# Patient Record
Sex: FEMALE | Race: WHITE | NOT HISPANIC OR LATINO | Employment: UNEMPLOYED | URBAN - METROPOLITAN AREA
[De-identification: names, ages, dates, MRNs, and addresses within clinical notes are randomized per-mention and may not be internally consistent; named-entity substitution may affect disease eponyms.]

---

## 2023-02-03 ENCOUNTER — TELEPHONE (OUTPATIENT)
Dept: FAMILY MEDICINE CLINIC | Facility: CLINIC | Age: 43
End: 2023-02-03
Payer: MEDICARE

## 2023-02-03 ENCOUNTER — HOSPITAL ENCOUNTER (INPATIENT)
Facility: HOSPITAL | Age: 43
LOS: 3 days | Discharge: HOME | DRG: 885 | End: 2023-02-06
Attending: PSYCHIATRY & NEUROLOGY | Admitting: PSYCHIATRY & NEUROLOGY
Payer: MEDICARE

## 2023-02-03 ENCOUNTER — HOSPITAL ENCOUNTER (EMERGENCY)
Facility: HOSPITAL | Age: 43
Discharge: PSYCHIATRIC OR SUBSTANCE USE FACILITY | DRG: 885 | End: 2023-02-03
Attending: EMERGENCY MEDICINE | Admitting: EMERGENCY MEDICINE
Payer: MEDICARE

## 2023-02-03 VITALS
RESPIRATION RATE: 16 BRPM | DIASTOLIC BLOOD PRESSURE: 80 MMHG | HEART RATE: 100 BPM | HEIGHT: 64 IN | OXYGEN SATURATION: 100 % | TEMPERATURE: 98.6 F | BODY MASS INDEX: 26.91 KG/M2 | SYSTOLIC BLOOD PRESSURE: 150 MMHG | WEIGHT: 157.63 LBS

## 2023-02-03 DIAGNOSIS — R45.851 DEPRESSION WITH SUICIDAL IDEATION: Primary | ICD-10-CM

## 2023-02-03 DIAGNOSIS — F32.A DEPRESSION WITH SUICIDAL IDEATION: Primary | ICD-10-CM

## 2023-02-03 PROBLEM — F33.1 MAJOR DEPRESSIVE DISORDER, RECURRENT EPISODE, MODERATE: Status: ACTIVE | Noted: 2023-02-03

## 2023-02-03 LAB
ALBUMIN SERPL-MCNC: 4.4 G/DL (ref 3.5–5.2)
ALBUMIN/GLOB SERPL: 1.5 G/DL
ALP SERPL-CCNC: 77 U/L (ref 39–117)
ALT SERPL W P-5'-P-CCNC: 9 U/L (ref 1–33)
AMPHET+METHAMPHET UR QL: NEGATIVE
ANION GAP SERPL CALCULATED.3IONS-SCNC: 9.1 MMOL/L (ref 5–15)
APAP SERPL-MCNC: <5 MCG/ML (ref 0–30)
AST SERPL-CCNC: 11 U/L (ref 1–32)
B-HCG UR QL: NEGATIVE
BARBITURATES UR QL SCN: NEGATIVE
BASOPHILS # BLD AUTO: 0.05 10*3/MM3 (ref 0–0.2)
BASOPHILS NFR BLD AUTO: 0.5 % (ref 0–1.5)
BENZODIAZ UR QL SCN: NEGATIVE
BILIRUB SERPL-MCNC: 0.3 MG/DL (ref 0–1.2)
BUN SERPL-MCNC: 10 MG/DL (ref 6–20)
BUN/CREAT SERPL: 12.8 (ref 7–25)
CALCIUM SPEC-SCNC: 9 MG/DL (ref 8.6–10.5)
CANNABINOIDS SERPL QL: POSITIVE
CHLORIDE SERPL-SCNC: 103 MMOL/L (ref 98–107)
CO2 SERPL-SCNC: 25.9 MMOL/L (ref 22–29)
COCAINE UR QL: NEGATIVE
CREAT SERPL-MCNC: 0.78 MG/DL (ref 0.57–1)
DEPRECATED RDW RBC AUTO: 52.1 FL (ref 37–54)
EGFRCR SERPLBLD CKD-EPI 2021: 97.4 ML/MIN/1.73
EOSINOPHIL # BLD AUTO: 0.25 10*3/MM3 (ref 0–0.4)
EOSINOPHIL NFR BLD AUTO: 2.6 % (ref 0.3–6.2)
ERYTHROCYTE [DISTWIDTH] IN BLOOD BY AUTOMATED COUNT: 17.7 % (ref 12.3–15.4)
ETHANOL BLD-MCNC: <10 MG/DL (ref 0–10)
ETHANOL UR QL: <0.01 %
GLOBULIN UR ELPH-MCNC: 2.9 GM/DL
GLUCOSE SERPL-MCNC: 97 MG/DL (ref 65–99)
HCT VFR BLD AUTO: 38.5 % (ref 34–46.6)
HGB BLD-MCNC: 12.4 G/DL (ref 12–15.9)
HOLD SPECIMEN: NORMAL
HOLD SPECIMEN: NORMAL
IMM GRANULOCYTES # BLD AUTO: 0.03 10*3/MM3 (ref 0–0.05)
IMM GRANULOCYTES NFR BLD AUTO: 0.3 % (ref 0–0.5)
LYMPHOCYTES # BLD AUTO: 1.41 10*3/MM3 (ref 0.7–3.1)
LYMPHOCYTES NFR BLD AUTO: 14.7 % (ref 19.6–45.3)
MCH RBC QN AUTO: 25.9 PG (ref 26.6–33)
MCHC RBC AUTO-ENTMCNC: 32.2 G/DL (ref 31.5–35.7)
MCV RBC AUTO: 80.4 FL (ref 79–97)
METHADONE UR QL SCN: NEGATIVE
MONOCYTES # BLD AUTO: 0.54 10*3/MM3 (ref 0.1–0.9)
MONOCYTES NFR BLD AUTO: 5.6 % (ref 5–12)
NEUTROPHILS NFR BLD AUTO: 7.28 10*3/MM3 (ref 1.7–7)
NEUTROPHILS NFR BLD AUTO: 76.3 % (ref 42.7–76)
NRBC BLD AUTO-RTO: 0 /100 WBC (ref 0–0.2)
OPIATES UR QL: NEGATIVE
OXYCODONE UR QL SCN: NEGATIVE
PLATELET # BLD AUTO: 369 10*3/MM3 (ref 140–450)
PMV BLD AUTO: 8.6 FL (ref 6–12)
POTASSIUM SERPL-SCNC: 4.1 MMOL/L (ref 3.5–5.2)
PROT SERPL-MCNC: 7.3 G/DL (ref 6–8.5)
RBC # BLD AUTO: 4.79 10*6/MM3 (ref 3.77–5.28)
SALICYLATES SERPL-MCNC: <0.3 MG/DL
SODIUM SERPL-SCNC: 138 MMOL/L (ref 136–145)
TSH SERPL DL<=0.05 MIU/L-ACNC: 2.04 UIU/ML (ref 0.27–4.2)
WBC NRBC COR # BLD: 9.56 10*3/MM3 (ref 3.4–10.8)
WHOLE BLOOD HOLD COAG: NORMAL
WHOLE BLOOD HOLD SPECIMEN: NORMAL

## 2023-02-03 PROCEDURE — 80307 DRUG TEST PRSMV CHEM ANLYZR: CPT | Performed by: EMERGENCY MEDICINE

## 2023-02-03 PROCEDURE — 99284 EMERGENCY DEPT VISIT MOD MDM: CPT

## 2023-02-03 PROCEDURE — 36415 COLL VENOUS BLD VENIPUNCTURE: CPT

## 2023-02-03 PROCEDURE — 81025 URINE PREGNANCY TEST: CPT | Performed by: PSYCHIATRY & NEUROLOGY

## 2023-02-03 PROCEDURE — 80053 COMPREHEN METABOLIC PANEL: CPT | Performed by: EMERGENCY MEDICINE

## 2023-02-03 PROCEDURE — 80179 DRUG ASSAY SALICYLATE: CPT | Performed by: EMERGENCY MEDICINE

## 2023-02-03 PROCEDURE — 80143 DRUG ASSAY ACETAMINOPHEN: CPT | Performed by: EMERGENCY MEDICINE

## 2023-02-03 PROCEDURE — 85025 COMPLETE CBC W/AUTO DIFF WBC: CPT

## 2023-02-03 PROCEDURE — 84443 ASSAY THYROID STIM HORMONE: CPT | Performed by: NURSE PRACTITIONER

## 2023-02-03 PROCEDURE — 82077 ASSAY SPEC XCP UR&BREATH IA: CPT | Performed by: EMERGENCY MEDICINE

## 2023-02-03 RX ORDER — HYDROXYZINE PAMOATE 50 MG/1
50 CAPSULE ORAL EVERY 6 HOURS PRN
Status: DISCONTINUED | OUTPATIENT
Start: 2023-02-03 | End: 2023-02-06 | Stop reason: HOSPADM

## 2023-02-03 RX ORDER — LORAZEPAM 2 MG/ML
2 INJECTION INTRAMUSCULAR EVERY 4 HOURS PRN
Status: DISCONTINUED | OUTPATIENT
Start: 2023-02-03 | End: 2023-02-06 | Stop reason: HOSPADM

## 2023-02-03 RX ORDER — HALOPERIDOL 5 MG/ML
5 INJECTION INTRAMUSCULAR EVERY 4 HOURS PRN
Status: DISCONTINUED | OUTPATIENT
Start: 2023-02-03 | End: 2023-02-06 | Stop reason: HOSPADM

## 2023-02-03 RX ORDER — TRAZODONE HYDROCHLORIDE 50 MG/1
100 TABLET ORAL NIGHTLY PRN
Status: DISCONTINUED | OUTPATIENT
Start: 2023-02-03 | End: 2023-02-06 | Stop reason: HOSPADM

## 2023-02-03 RX ORDER — DIPHENHYDRAMINE HYDROCHLORIDE 50 MG/ML
50 INJECTION INTRAMUSCULAR; INTRAVENOUS EVERY 4 HOURS PRN
Status: DISCONTINUED | OUTPATIENT
Start: 2023-02-03 | End: 2023-02-06 | Stop reason: HOSPADM

## 2023-02-03 RX ORDER — LOPERAMIDE HYDROCHLORIDE 2 MG/1
2 CAPSULE ORAL
Status: DISCONTINUED | OUTPATIENT
Start: 2023-02-03 | End: 2023-02-06 | Stop reason: HOSPADM

## 2023-02-03 RX ORDER — DIPHENHYDRAMINE HCL 50 MG
50 CAPSULE ORAL EVERY 4 HOURS PRN
Status: DISCONTINUED | OUTPATIENT
Start: 2023-02-03 | End: 2023-02-06 | Stop reason: HOSPADM

## 2023-02-03 RX ORDER — ACETAMINOPHEN 325 MG/1
650 TABLET ORAL EVERY 4 HOURS PRN
Status: DISCONTINUED | OUTPATIENT
Start: 2023-02-03 | End: 2023-02-06 | Stop reason: HOSPADM

## 2023-02-03 RX ORDER — HALOPERIDOL 5 MG/1
5 TABLET ORAL EVERY 4 HOURS PRN
Status: DISCONTINUED | OUTPATIENT
Start: 2023-02-03 | End: 2023-02-06 | Stop reason: HOSPADM

## 2023-02-03 RX ORDER — LORAZEPAM 2 MG/1
2 TABLET ORAL EVERY 4 HOURS PRN
Status: DISCONTINUED | OUTPATIENT
Start: 2023-02-03 | End: 2023-02-06 | Stop reason: HOSPADM

## 2023-02-03 RX ORDER — SODIUM CHLORIDE 0.9 % (FLUSH) 0.9 %
10 SYRINGE (ML) INJECTION AS NEEDED
Status: DISCONTINUED | OUTPATIENT
Start: 2023-02-03 | End: 2023-02-03 | Stop reason: HOSPADM

## 2023-02-03 RX ORDER — ALUMINA, MAGNESIA, AND SIMETHICONE 2400; 2400; 240 MG/30ML; MG/30ML; MG/30ML
15 SUSPENSION ORAL EVERY 6 HOURS PRN
Status: DISCONTINUED | OUTPATIENT
Start: 2023-02-03 | End: 2023-02-06 | Stop reason: HOSPADM

## 2023-02-03 RX ORDER — NICOTINE 21 MG/24HR
1 PATCH, TRANSDERMAL 24 HOURS TRANSDERMAL
Status: DISCONTINUED | OUTPATIENT
Start: 2023-02-03 | End: 2023-02-06 | Stop reason: HOSPADM

## 2023-02-03 RX ADMIN — TRAZODONE HYDROCHLORIDE 100 MG: 50 TABLET ORAL at 22:01

## 2023-02-03 NOTE — TELEPHONE ENCOUNTER
Per patients mother daughter is experiencing anxiety and depression. Per hub and mother daughter has self harmed herself. Advised mother that patient needs to be seen in the emergency room. Mother voiced understanding.

## 2023-02-03 NOTE — SIGNIFICANT NOTE
02/03/23 1247   Plan   Plan Comments LÓPEZ Rollins contacted [facility] for psych consult at 12:47pm.   Final Discharge Disposition Code 30 - still a patient

## 2023-02-03 NOTE — ED PROVIDER NOTES
Time: 10:49 AM EST  Date of encounter:  2/3/2023  Independent Historian/Clinical History and Information was obtained by:   Patient and Family  Chief Complaint: Anxiety, Depression, SI    History is limited by: Cognitive Impairment    History of Present Illness:  Patient is a 43 y.o. year old female who presents to the emergency department for evaluation of anxiety and depression.  The patient presents with her mother.  Patient does have a history of cognitive disability.  She is very tearful.  She tells me that she has been dealing with depression and suicidal thoughts for the last several days.  She states last night she tried to cut her wrists with intent to harm herself.  She denies any past history of suicide attempt.  She reports that depression has surfaced over the last couple of months and has progressively worsened.  She states that she is under a lot of stress at home.  She denies a plan currently but is agreeable to inpatient treatment for her thoughts.    HPI    Patient Care Team  Primary Care Provider: Provider, No Known    Past Medical History:     No Known Allergies  Past Medical History:   Diagnosis Date   • Anxiety      History reviewed. No pertinent surgical history.  History reviewed. No pertinent family history.    Home Medications:  Prior to Admission medications    Not on File        Social History:   Social History     Tobacco Use   • Smoking status: Never   • Smokeless tobacco: Current     Types: Chew   Vaping Use   • Vaping Use: Never used   Substance Use Topics   • Alcohol use: Yes     Comment: beer on occassion   • Drug use: Yes     Types: Marijuana     Comment: took marijuana gummy last night from a friend to help w anxiety         Review of Systems:  Review of Systems   Constitutional: Negative for activity change, appetite change, fatigue and fever.   HENT: Negative.    Eyes: Negative.    Respiratory: Negative for cough and shortness of breath.    Cardiovascular: Negative for chest  "pain.   Gastrointestinal: Negative for abdominal pain, diarrhea, nausea and vomiting.   Endocrine: Negative.    Genitourinary: Negative.    Musculoskeletal: Negative for myalgias.   Skin: Negative for color change, rash and wound.   Allergic/Immunologic: Negative.    Neurological: Negative for dizziness, weakness, light-headedness and headaches.   Hematological: Negative.    Psychiatric/Behavioral: Positive for self-injury, sleep disturbance and suicidal ideas. Negative for agitation and hallucinations. The patient is nervous/anxious.         Physical Exam:  /80 (BP Location: Right arm, Patient Position: Lying)   Pulse 100   Temp 98.6 °F (37 °C) (Oral)   Resp 16   Ht 162.6 cm (64\")   Wt 71.5 kg (157 lb 10.1 oz)   SpO2 100%   BMI 27.06 kg/m²     Physical Exam  Vitals and nursing note reviewed.   Constitutional:       General: She is in acute distress.      Appearance: She is not ill-appearing or toxic-appearing.   HENT:      Head: Normocephalic and atraumatic.      Nose: Nose normal.      Mouth/Throat:      Mouth: Mucous membranes are moist.      Pharynx: Oropharynx is clear.   Eyes:      Extraocular Movements: Extraocular movements intact.      Conjunctiva/sclera: Conjunctivae normal.      Pupils: Pupils are equal, round, and reactive to light.   Cardiovascular:      Rate and Rhythm: Normal rate and regular rhythm.      Pulses: Normal pulses.      Heart sounds: Normal heart sounds.   Pulmonary:      Effort: Pulmonary effort is normal.      Breath sounds: Normal breath sounds.   Abdominal:      Palpations: Abdomen is soft.   Musculoskeletal:      Cervical back: Normal range of motion and neck supple.   Skin:     General: Skin is warm and dry.      Capillary Refill: Capillary refill takes less than 2 seconds.      Findings: No rash.      Comments: Patient does have obvious superficial abrasions in a linear pattern to her left wrist.  No bleeding at this time.   Neurological:      General: No focal deficit " present.      Mental Status: She is alert and oriented to person, place, and time.   Psychiatric:         Attention and Perception: She does not perceive visual hallucinations.         Mood and Affect: Mood is depressed. Affect is tearful.         Behavior: Behavior is withdrawn.         Thought Content: Thought content includes suicidal ideation. Thought content does not include homicidal ideation. Thought content does not include homicidal or suicidal plan.                  Procedures:  Procedures      Medical Decision Making:      Comorbidities that affect care:    Developmental delay    External Notes reviewed:    None      The following orders were placed and all results were independently analyzed by me:  Orders Placed This Encounter   Procedures   • [facility] Draw   • Comprehensive Metabolic Panel   • Acetaminophen Level   • Ethanol   • Salicylate Level   • Urine Drug Screen - Urine, Clean Catch   • CBC Auto Differential   • TSH Rfx On Abnormal To Free T4   • NPO Diet NPO Type: Strict NPO   • Cardiac Monitoring   • Continuous Pulse Oximetry   • Vital Signs   • Undress & Gown   • Psych / Access to See   • IP General Consult (Use specialty-specific consult if known)   • POC Glucose Once   • Insert Peripheral IV   • Suicide Precautions   • CBC & Differential   • Green Top (Gel)   • Lavender Top   • Gold Top - SST   • Light Blue Top       Medications Given in the Emergency Department:  Medications   sodium chloride 0.9 % flush 10 mL (has no administration in time range)        ED Course:    ED Course as of 02/03/23 1613   Fri Feb 03, 2023   1320 [facility] staff here for evaluation at this time. [AR]   1606 Spoke with Dr. Thomson, on-call psychiatrist who agrees to admit patient for further evaluation management to [facility]. [AR]      ED Course User Index  [AR] Kim Madrigal, CHACHA       Labs:    Lab Results (last 24 hours)     Procedure Component Value Units Date/Time    CBC & Differential [[id number]]   (Abnormal) Collected: 02/03/23 1035    Specimen: Blood Updated: 02/03/23 1040    Narrative:      The following orders were created for panel order CBC & Differential.  Procedure                               Abnormality         Status                     ---------                               -----------         ------                     CBC Auto Differential[[id number]]        Abnormal            Final result                 Please view results for these tests on the individual orders.    Comprehensive Metabolic Panel [[id number]] Collected: 02/03/23 1035    Specimen: Blood Updated: 02/03/23 1104     Glucose 97 mg/dL      BUN 10 mg/dL      Creatinine 0.78 mg/dL      Sodium 138 mmol/L      Potassium 4.1 mmol/L      Chloride 103 mmol/L      CO2 25.9 mmol/L      Calcium 9.0 mg/dL      Total Protein 7.3 g/dL      Albumin 4.4 g/dL      ALT (SGPT) 9 U/L      AST (SGOT) 11 U/L      Alkaline Phosphatase 77 U/L      Total Bilirubin 0.3 mg/dL      Globulin 2.9 gm/dL      A/G Ratio 1.5 g/dL      BUN/Creatinine Ratio 12.8     Anion Gap 9.1 mmol/L      eGFR 97.4 mL/min/1.73     Narrative:      GFR Normal >60  Chronic Kidney Disease <60  Kidney Failure <15      Acetaminophen Level [[id number]]  (Normal) Collected: 02/03/23 1035    Specimen: Blood Updated: 02/03/23 1104     Acetaminophen <5.0 mcg/mL     Ethanol [[id number]] Collected: 02/03/23 1035    Specimen: Blood Updated: 02/03/23 1104     Ethanol <10 mg/dL      Ethanol % <0.010 %     Narrative:      Ethanol (Plasma)  <10 Essentially Negative    Toxic Concentrations           mg/dL    Flushing, slowing of reflexes    Impaired visual activity         Depression of CNS              >100  Possible Coma                  >300       Salicylate Level [[id number]]  (Normal) Collected: 02/03/23 1035    Specimen: Blood Updated: 02/03/23 1104     Salicylate <0.3 mg/dL     CBC Auto Differential [[id number]]  (Abnormal) Collected: 02/03/23 1035    Specimen: Blood Updated: 02/03/23  1040     WBC 9.56 10*3/mm3      RBC 4.79 10*6/mm3      Hemoglobin 12.4 g/dL      Hematocrit 38.5 %      MCV 80.4 fL      MCH 25.9 pg      MCHC 32.2 g/dL      RDW 17.7 %      RDW-SD 52.1 fl      MPV 8.6 fL      Platelets 369 10*3/mm3      Neutrophil % 76.3 %      Lymphocyte % 14.7 %      Monocyte % 5.6 %      Eosinophil % 2.6 %      Basophil % 0.5 %      Immature Grans % 0.3 %      Neutrophils, Absolute 7.28 10*3/mm3      Lymphocytes, Absolute 1.41 10*3/mm3      Monocytes, Absolute 0.54 10*3/mm3      Eosinophils, Absolute 0.25 10*3/mm3      Basophils, Absolute 0.05 10*3/mm3      Immature Grans, Absolute 0.03 10*3/mm3      nRBC 0.0 /100 WBC     TSH Rfx On Abnormal To Free T4 [[id number]]  (Normal) Collected: 02/03/23 1035    Specimen: Blood Updated: 02/03/23 1334     TSH 2.040 uIU/mL     Urine Drug Screen - Urine, Clean Catch [[id number]]  (Abnormal) Collected: 02/03/23 1115    Specimen: Urine, Clean Catch Updated: 02/03/23 1138     Amphet/Methamphet, Screen Negative     Barbiturates Screen, Urine Negative     Benzodiazepine Screen, Urine Negative     Cocaine Screen, Urine Negative     Opiate Screen Negative     THC, Screen, Urine Positive     Methadone Screen, Urine Negative     Oxycodone Screen, Urine Negative    Narrative:      Negative Thresholds Per Drugs Screened:    Amphetamines                 500 ng/ml  Barbiturates                 200 ng/ml  Benzodiazepines              100 ng/ml  Cocaine                      300 ng/ml  Methadone                    300 ng/ml  Opiates                      300 ng/ml  Oxycodone                    100 ng/ml  THC                           50 ng/ml    The Normal Value for all drugs tested is negative. This report includes final unconfirmed screening results to be used for medical treatment purposes only. Unconfirmed results must not be used for non-medical purposes such as employment or legal testing. Clinical consideration should be applied to any drug of abuse test, particularly  when unconfirmed results are used.                   Imaging:    No Radiology Exams Resulted Within Past 24 Hours      Differential Diagnosis and Discussion:    Psychiatric: Differential diagnosis includes but is not limited to depression, psychosis, bipolar disorder, anxiety, manic episode, schizophrenia, and substance abuse.    All labs were reviewed and analyzed by me.    MDM  Number of Diagnoses or Management Options  Depression with suicidal ideation  Diagnosis management comments: Patient presents today with anxiety, depression and suicidal thoughts.  She does have superficial self-inflicted wounds to her left wrist.  Patient declines having plan today but is very tearful and has a depressed mood.  Patient was evaluated by community care as well as their DD ID staff for evaluation.  It was recommended that patient receive placement and higher level of care given her thoughts as well as her history.  Overall labs were normal.  Urine drug screen was positive for THC.  Vitals are stable.  Patient is cooperative but very tearful and sad.  Spoke with Dr. Thomson, on-call psychiatrist who has agreed to admit the patient for further evaluation and management.       Amount and/or Complexity of Data Reviewed  Clinical lab tests: reviewed and ordered  Tests in the medicine section of CPT®: ordered and reviewed  Obtain history from someone other than the patient: yes (mother)  Review and summarize past medical records: yes    Risk of Complications, Morbidity, and/or Mortality  Presenting problems: high  Diagnostic procedures: moderate  Management options: high             Patient Care Considerations:    All pertinent care and consideration provided for patient management today      Consultants/Shared Management Plan:    Consultant: I have discussed the case with Dr. Thomson who states Patient will be [facility] to [facility] for further evaluation and management of care    Social Determinants of Health:    Patient has  presented with family members who are responsible, reliable and will ensure follow up care.      Disposition and Care Coordination:    Admit:   Through independent evaluation of the patient's history, physical, and imperical data, the patient meets criteria for observation/admission to the hospital.        Final diagnoses:   Depression with suicidal ideation        ED Disposition     ED Disposition   DC/Transfer to Behavioral Health Condition   Stable    Comment   --             This medical record created using voice recognition software.           Kim Madrigal, APRN  02/03/23 1616

## 2023-02-03 NOTE — NURSING NOTE
42 y/o female was admitted from the Ed to the [facility] unit at 1742 to the services of dr. Quezada. Per report from Ed pt. Came to the ED c/o increased depression and anxiety, reporting that last night she had superficially cut her wrist and a care giver had given her a marijuana gummy to help her relax.. Pt. On arrival pt. Was searched for sharps and contraband by female staff. Pt. Was calm and cooperative with a few tears during assessment. Pt. Denied any si/hi/avh at this time and contracted for safety, as well as denied any past si or psych admissions. Pt. Reports the superficial cut on her wrist was not really a suicide attempt, but just some self harm. Pt. Talked about just having some stress at home, but could not really specify anything in particular. Pt. Was oriented to the unit and is waiting for her dinner. Pt. bathroom door locked for safety. Will con't to monitor and provide a safe environment.

## 2023-02-03 NOTE — TELEPHONE ENCOUNTER
Pt has not established with this office. She has a new patient appointment with Brynn on 2/28/2023

## 2023-02-04 PROBLEM — F12.10 MARIJUANA ABUSE: Status: ACTIVE | Noted: 2023-02-04

## 2023-02-04 PROBLEM — R41.83 BORDERLINE INTELLECTUAL FUNCTIONING: Status: ACTIVE | Noted: 2023-02-04

## 2023-02-04 RX ORDER — VENLAFAXINE HYDROCHLORIDE 75 MG/1
75 CAPSULE, EXTENDED RELEASE ORAL
Status: DISCONTINUED | OUTPATIENT
Start: 2023-02-04 | End: 2023-02-06 | Stop reason: HOSPADM

## 2023-02-04 RX ADMIN — VENLAFAXINE HYDROCHLORIDE 75 MG: 75 CAPSULE, EXTENDED RELEASE ORAL at 11:30

## 2023-02-04 NOTE — PLAN OF CARE
"Goal Outcome Evaluation:  Plan of Care Reviewed With: patient    Pt is alert, oriented, not in distress. Pt denies current SI, HI or AVH. Pt denies current anxiety and endorses intermittent depression that \"comes and goes.\" Pt did attend group. Pt stated that she needed medication for sleep, so she was given trazodone. Will continue to monitor this patient and provide a safe environment. -- AS RN  "

## 2023-02-04 NOTE — PLAN OF CARE
Goal Outcome Evaluation:  Plan of Care Reviewed With: patient  Patient Agreement with Plan of Care: agrees   PATIENT ALERT AND ORIENTED AND CALM AND COOPERATIVE WITH STAFF. COMPLIANT WITH MEDICATIONS. PATIENT DENIES S/I, H/I OR HALLUCINATIONS. PATIENT REPORTS STRESSORS INVOLVING HER HOME LIFE CAUSED HER TO BECOME MORE DEPRESSED AND ANXIOUS. PATIENT HAS BEEN UP IN DAYROOM MOST OF SHIFT INTERACTING WITH HER PEERS. NO INAPPROPRIATE OR AGGRESSIVE BEHAVIOR NOTED. WILL CONTINUE TO MONITOR FOR CHANGES

## 2023-02-04 NOTE — H&P
" [facility]   PSYCHIATRIC  HISTORY AND PHYSICAL    Patient Name: Angela Song  : 1980  MRN: [id number]  Primary Care Physician:  Kiera, No Known  Date of admission: 2/3/2023    Subjective   Subjective     Chief Complaint: \"Tried to cut myself\"    HPI:     Angela Song is a 43 y.o. female with a history of anxiety,depression, hypothyroidism, hypertension, as well as borderline intellectual functioning.  Patient is tearful during the exam but is appropriate and controlled.  She reports that she cut her wrist and she has 4 scratches to the anterior left wrist which required no treatment.  Mom saw the scratches called her primary care doctor suggested she come to the emergency room.  Please suicidal ideation while in emergency room.    Patient reports she has a lot going on and that there is been a lot of changes very quickly at home.  She states that her grandfather is moved out of the house and is sad or down.  She also reports that her nieces moved out of the house, and when his mood way from town she is unable to see them as much as she used to and is down about this.  She states there is been lots happening very quickly but cannot really quantify any other changes in her life.  She reports her 2 nieces were like her own kids.  She reports that she misses family.  She is upset throughout the exam as well up with tears does appear quite sad and down.    She reports that she feels very anxious and states, \"stuff gets my head and I get into a panic.\"  She feels like people often talk about her behind her back.  She reports her anxiety and depression started 4 to 5 months ago.  Reports that she just sits at home does not go places and this gets her down.  She does not have a 's license and has been on others.  Reports depression makes her feel sad.  She has had a decrease in sleep and when she tries to go to sleep describes her mind is racing.  Her energy level has been good.  She feels " helpless at times.  She has had a good appetite.  She denies suicidal ideations today and states she does not know why she attempted to cut her wrist yesterday.  She is unable to identify any particular stressor that led to her suicide attempt.  However she does report that she feels useless and has some self-esteem issues.  She has been increasingly emotional and tearful.  Ports that she can also be more irritable lately.          Review of Systems:      CONSTITUTIONAL: Feels well denies any acute medical problems  HEENT: No visual problems, no hearing difficulty, no dysphasia,  LUNGS: no cough, no shortness of breath;  CARDIOVASCULAR: no palpitation, no chest pain,   GI: no abdominal pain, no nausea, no vomiting, no diarrhea, no constipation;  AMIE: no dysuria, no hematuria, no frequency or urgency,   MUSCULOSKELETAL: no joint pain, no swelling, no stiffness;  ENDOCRINE: no cold or heat intolerance;  HEMATOLOGY: no easy bruising or bleeding,   DERMATOLOGY: no skin rash, no pruritus;  NEUROLOGY: no syncope, no seizures, no numbness or tingling of hands, no   numbness or tingling of feet,   PSYCHIATRIC: As documented in HPI    Personal History     Past Medical History:   Diagnosis Date   • Anxiety    • Borderline intellectual functioning    • Hypertension    • Hypothyroid        History reviewed. No pertinent surgical history.    Past Psychiatric History: Denies seeing a psychiatrist in the past.  Has had medications prescribed by primary care provider for anxiety    Psychiatric Hospitalizations: This is her first hospitalization    Suicide Attempts: Denies any previous attempts    Prior Treatment and Medications Tried: Believes she may have been on Zoloft but is unsure      Family History: family history includes Alcohol abuse in her sister; Clotting disorder in her father; Diabetes in her maternal grandfather; Hypertension in her mother and sister; Suicidality in her maternal uncle. Otherwise pertinent FHx was  reviewed and not pertinent to current issue.    Family Substance Abuse History:None known to patient      Social History:     Lives with mom and sister.  Born and raised in [place].  [place] farm.    Mom is legal guardian    She was never in the     Described himself as spiritual.    Denies history of abuse    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   • Highest education level: High school graduate   Tobacco Use   • Smoking status: Never   • Smokeless tobacco: Current     Types: Chew   Vaping Use   • Vaping Use: Never used   Substance and Sexual Activity   • Alcohol use: Yes     Comment: beer on occassion   • Drug use: Yes     Types: Marijuana     Comment: took marijuana gummy last night from a friend to help w anxiety   • Sexual activity: Defer       Substance Abuse History: reports that she has never smoked. Her smokeless tobacco use includes chew. She reports current alcohol use. She reports current drug use. Drug: Marijuana.    Home Medications: None         Allergies:  No Known Allergies    Objective   Objective     Vitals:   Temp:  [97.5 °F (36.4 °C)-98.1 °F (36.7 °C)] 98.1 °F (36.7 °C)  Heart Rate:  [62-78] 74  Resp:  [16-18] 18  BP: (114-151)/(85-98) 114/85    Physical Exam:      CONSTITUTIONAL: Patient is well developed, well nourished, awake and alert.  HEENT: Head and neck are normocephalic and atraumatic. Pupils equal and  round.  Sclerae clear. No icterus.  LUNGS: Even unlabored respirations.  CARDIAC: Normal rate and rhythm.  ABDOMEN: Nondistended.  SKIN: Clean, dry, intact.  4 scratches self-inflicted on the left anterior wrist  EXTREMITIES: No clubbing, cyanosis, edema.  MUSCULOSKELETAL: Symmetric body habitus. Spine straight. Strength intact,  full range of motion.  NEUROLOGIC: Appropriate. No abnormal movements, good muscle tone.                              Cerebellar:  "station and gait steady.  Cranial Nerves:  CN II: Visual fields without deficit.  CN III: Pupils symmetric.  CN III, IV, VI:  Extraocular eye muscles intact, no nystagmus.  CN V: Jaw open and closing normal.  CN VII: Frown and smile symmetric.  CN VIII: Hearing intact.  CN IX, X: Palate rise normal; phonation without hoarseness.  CN XI: Shoulder shrug equal.  CN XII: Tongue midline, no fasciculations, no dysarthria.    Mental Status Exam:     Awake, alert, oriented female appears appropriate for stated age.  She participates fully in exam.  Has an obvious learning disability but able to participate in exam appropriately with full understanding of situation in interview.       Hygiene:   good  Cooperation:  Cooperative  Eye Contact:  Fair  Psychomotor Behavior:  Appropriate  Affect:  Restricted and Tearful  Mood: \"I do good and then I do not\"  Speech:  Normal  Language: Appropriate, relevant  Thought Process:  Goal directed and Somewhat simple, concrete  Thought Content:  Normal  Suicidal:  Denies today  Homicidal:  None  Hallucinations:  None  Delusion:  None  Memory:  Intact  Orientation:  Person, Place, Time and Situation  Reliability:  fair  Insight:  Fair  Judgement:  Impaired  Impulse Control:  Impaired        Result Review    Result Review:  I have personally reviewed the results from the time of this admission to 2/4/2023 10:51 EST and agree with these findings:  [x]  Laboratory  []  Microbiology  []  Radiology  []  EKG/Telemetry   []  Cardiology/Vascular   []  Pathology  []  Old records  []  Other:  Most notable findings include: No pertinent negative or positives    Assessment & Plan   Assessment / Plan     Brief Patient Summary:  Angela Song is a 43 y.o. female who with history of hypertension and hypothyroidism not been resolved under control without medication.  She is here on a voluntary basis after suicide attempt with increased depression and anxiety.    Active Hospital Problems:  Active Hospital " Problems    Diagnosis    • **Major depressive disorder, recurrent episode, moderate (HCC)    • Marijuana abuse    • Borderline intellectual functioning        Plan:   • Initiate venlafaxine 75 mg daily for depression and anxiety.  Discussed medication with the patient and she is agreeable.  • History of hypertension and hypothyroidism previously treated and has been off medications for some time and has been normotensive and TSH normal and not requiring any treatment or further investigation  • Admit for safety and stabilization and begin treatment for underlying mood disorder or psychosis with appropriate medications  • Attempt to gain collateral information of possible  • Work on safety plan  • Provide supportive therapy  • Patient to engage in all group and individual treatment modalities available including milieu therapy  • Work on appropriate disposition follow-up  • Estimated length of stay in hospital 4 to 5 days      DVT prophylaxis:  Mechanical DVT prophylaxis orders are present.    CODE STATUS:    Code Status (Patient has no pulse and is not breathing): CPR (Attempt to Resuscitate)  Medical Interventions (Patient has pulse or is breathing): Full Support      Admission Status:  I believe this patient meets inpatient status.      Part of this note may be an electronic transcription/translation of spoken language to printed text using the Dragon dictation system.        Electronically signed by Hermilo Quezada MD, 02/04/23, 10:30 AM EST.

## 2023-02-05 RX ADMIN — VENLAFAXINE HYDROCHLORIDE 75 MG: 75 CAPSULE, EXTENDED RELEASE ORAL at 08:45

## 2023-02-05 NOTE — PLAN OF CARE
Goal Outcome Evaluation:  Plan of Care Reviewed With: patient  Patient Agreement with Plan of Care: agrees         Pt has been out in dayroom and in her room today.  She is calm and cooperative and pleasant.  She denies SI/HI, AVH, and contracts for safety.  She rates depression 1 and anxiety 10, but declines medication for symptom control.  She is med compliant.  Pt family visited today. Her mother is asking about discharge planning and reports she has already been in contact with [facility] and has an appointment in one month.  She reports she had been caret-aking for patient's grandfather, and she believes patient has been feeling neglected.  She reports that a few weeks ago, she walked three miles away from home. Provided pt mother with information on Effexor and supporting someone with depression. Pt is able to make her needs known. Will continue to monitor.

## 2023-02-05 NOTE — PLAN OF CARE
Goal Outcome Evaluation:  Plan of Care Reviewed With: patient    Pt is alert, oriented, not in distress. Pt denies current SI, HI or AVH. Pt states that she had depression and anxiety earlier, but not today. Pt was observed in the dayroom interacting with her peers appropriately. Pt advised that the trazodone did help her sleep last night and would like that again tonight. Will continue to monitor this patient and provide a safe environment. -- AS RN

## 2023-02-05 NOTE — PROGRESS NOTES
" [facility]     Psychiatric Progress Note    Patient Name: Angela Song  : 1980  MRN: [id number]  Primary Care Physician:  Provider, No Known  Date of admission: 2/3/2023    Subjective   Subjective     Patient seen and chart reviewed, discussed with staff.    Chief Complaint: Depression, anxiety      HPI:     Staff reports the patient slept well last night.  She been denying suicidal or homicidal ideation as well as any hallucinations.  Reported her anxiety as a 10 and depression as a 1 to staff.  She was noted to slept well last night.  Talk with multiple family members on the phone.    Patient day is calm and cooperative.  She reports that she is doing better.  She denies having any acute panic but continues to report having severe anxiety.  She reports that she just gets very worried and over thinks things.  She does appear to be a little anxious today.  She reports that she slept well and states, \"I did not need a sleeping pill\" but she did get a dose of trazodone.  She reports her mood has been pretty good but she continues to feel worried and nervous.  He did not require any Vistaril discussed that she has medicine available for anxiety if she experiences acute anxiety.  She is hopeful family can visit [facility].      Objective   Objective     Vitals:   Temp:  [97.8 °F (36.6 °C)-98 °F (36.7 °C)] 97.8 °F (36.6 °C)  Heart Rate:  [60-64] 64  Resp:  [18] 18  BP: (128-132)/(83-84) 132/83          Mental Status Exam:      Appearance:   Well-developed, well-nourished, calm, cooperative  Reliability:   Fair to good  Eye Contact:   Fair, somewhat avoidant  Concentration/Focus:    Attentive to the interview  Behaviors:    No restlessness or agitation  Memory :    Sensorium intact, no deficits  Speech:    Spontaneous, normal rate and volume  Language:   Appropriate, relevant  Mood :    \"Pretty good\"  Affect:    Anxious, constricted  Thought process:    Linear with no paranoia or delusions   Thought Content:   "  Denies suicidal or homicidal ideation, denies hallucinations and no evidence of hallucinations  Insight:   Good  Judgement:    Intact, no behavioral disturbance      Result Review    Result Review:  I have personally reviewed the results from the time of this admission to 2/5/2023 10:19 EST and agree with these findings:  []  Laboratory  []  Microbiology  []  Radiology  []  EKG/Telemetry   []  Cardiology/Vascular   []  Pathology  []  Old records  []  Other:  Most notable findings include:     Lab Results (last 24 hours)     ** No results found for the last 24 hours. **              Medications:   nicotine, 1 patch, Transdermal, Q24H  venlafaxine XR, 75 mg, Oral, Daily With Breakfast          Assessment / Plan       Active Hospital Problems:  Active Hospital Problems    Diagnosis    • **Major depressive disorder, recurrent episode, moderate (HCC)    • Marijuana abuse    • Borderline intellectual functioning        Plan:     • Continue current treatment protocol and titrate medications as clinically indicated  • Hopefully family can visit today to provide feedback  • Work on mood stabilization and abatement of any suicidal ideation or psychosis.  Work on appropriate safety plan  • Continue supportive therapy  • Patient to engage in all group and individual treatment modalities available on the unit  • Obtain collateral information if possible  • Titrate medications as clinically indicated  • Work on appropriate disposition follow-up including referrals to substance abuse treatment if indicated      Disposition:  I expect patient to be discharged 1 to 2 days.    Part of this note may be an electronic transcription/translation of spoken language to printed text using the Dragon dictation system.         Electronically signed by Hermilo Quezada MD, 02/05/23, 10:19 AM EST.

## 2023-02-06 VITALS
BODY MASS INDEX: 26.91 KG/M2 | DIASTOLIC BLOOD PRESSURE: 97 MMHG | SYSTOLIC BLOOD PRESSURE: 137 MMHG | OXYGEN SATURATION: 100 % | WEIGHT: 157.63 LBS | HEIGHT: 64 IN | TEMPERATURE: 97.5 F | HEART RATE: 66 BPM | RESPIRATION RATE: 16 BRPM

## 2023-02-06 PROBLEM — F41.1 GENERALIZED ANXIETY DISORDER: Status: ACTIVE | Noted: 2023-02-06

## 2023-02-06 PROCEDURE — 90686 IIV4 VACC NO PRSV 0.5 ML IM: CPT | Performed by: PSYCHIATRY & NEUROLOGY

## 2023-02-06 PROCEDURE — G0008 ADMIN INFLUENZA VIRUS VAC: HCPCS | Performed by: PSYCHIATRY & NEUROLOGY

## 2023-02-06 PROCEDURE — 25010000002 INFLUENZA VAC SPLIT QUAD 0.5 ML SUSPENSION PREFILLED SYRINGE: Performed by: PSYCHIATRY & NEUROLOGY

## 2023-02-06 RX ORDER — VENLAFAXINE HYDROCHLORIDE 75 MG/1
75 CAPSULE, EXTENDED RELEASE ORAL
Qty: 30 CAPSULE | Refills: 2 | Status: SHIPPED | OUTPATIENT
Start: 2023-02-07

## 2023-02-06 RX ADMIN — VENLAFAXINE HYDROCHLORIDE 75 MG: 75 CAPSULE, EXTENDED RELEASE ORAL at 08:24

## 2023-02-06 RX ADMIN — INFLUENZA VIRUS VACCINE 0.5 ML: 15; 15; 15; 15 SUSPENSION INTRAMUSCULAR at 11:04

## 2023-02-06 NOTE — PLAN OF CARE
Goal Outcome Evaluation:  Plan of Care Reviewed With: patient  Patient Agreement with Plan of Care: agrees   PATIENT HAS REACHED ALL GOALS AND WILL BE DISCHARGED FROM UNIT.  PATIENT WILL CONTINUE TREATMENT ON OUTPATIENT BASIS .

## 2023-02-06 NOTE — PLAN OF CARE
"Goal Outcome Evaluation:  Plan of Care Reviewed With: patient  Patient Agreement with Plan of Care: agrees    Patient was awake and alert upon start of shift, clear speech, flat affect, describes moood as \"good\", able to make needs known, denies SI/HI, denies AVH, rates anxiety 2/10 and depression 2/10, pt stated she was feeling better, patient contracts for safety, pt was in dayroom watching tv but withdrawn, pt went to sleep without issues. Will continue to monitor and provide safe environment.           "

## 2023-02-06 NOTE — DISCHARGE SUMMARY
[facility]         DISCHARGE SUMMARY    Patient Name: Angela Song  : 1980  MRN: [id number]    Date of Admission: 2/3/2023  Date of Discharge: 2023  Primary Care Physician: Provider, No Known    Consults     No orders found from 2023 to 2023.          Presenting Problem:   Major depressive disorder, recurrent episode, moderate (HCC) [F33.1]    Active and Resolved Hospital Problems:  Active Hospital Problems    Diagnosis POA   • **Major depressive disorder, recurrent episode, moderate (HCC) [F33.1] Yes   • Generalized anxiety disorder [F41.1] Yes   • Marijuana abuse [F12.10] Yes   • Borderline intellectual functioning [R41.83] Not Applicable      Resolved Hospital Problems   No resolved problems to display.         Hospital Course       Hospital Course:  Angela Song is a 43 y.o. female minute on voluntary basis for depression and anxiety with suicidal ideation and self-inflicted scratches to wrist.    The patient reports that she had a lot on her mind felt very overwhelmed.  She reports being very anxious and reports that she has had panic attacks.  Patient, cooperative throughout her stay.  She was denying suicidal ideations on initial evaluation.  Patient has a history of a learning disability and borderline intellectual functioning but is able to fully participate in interview and care.    Discussed medications at length and she thinks she has had 1 previous trial of the medication.  Reported she may have been on sertraline in the past.  We discussed use of venlafaxine and she was agreeable.    Patient been calm and cooperative throughout her stay.  She is in no acute agitation or anxiety.  She does not require any medicines for acute agitation or anxiety.  She required trazodone on her first night for sleep and has not required it since that time.  She tolerated the venlafaxine well and had no side effects.  Family visited on day prior to discharge and the visit went  "very well.  Family reports the patient appears improved and they agree that she could be discharged home safely.    Day of discharge she is calm, cooperative, engaging, makes good eye contact.  She is future oriented goal-directed.  There is no psychomotor restlessness or agitation.  Speech is articulate, fluent, normal rate and volume.  Language is appropriate relevant.  Mood is described as \"pretty good\" she has congruent affect.  Thought processes are simple but goal directed and linear.  Thought content is negative for suicidal or homicidal ideation auditory visualizations.  Insight and judgment are intact.        DISCHARGE Follow Up Recommendations for labs and diagnostics: Routine health maintenance from [facility], [facility]      Day of Discharge     Vital Signs:  Temp:  [97.5 °F (36.4 °C)-97.6 °F (36.4 °C)] 97.5 °F (36.4 °C)  Heart Rate:  [62-66] 66  Resp:  [16-18] 16  BP: (133-137)/(87-97) 137/97      Pertinent  and/or Most Recent Results     LAB RESULTS:      Lab 02/03/23  1035   WBC 9.56   HEMOGLOBIN 12.4   HEMATOCRIT 38.5   PLATELETS 369   NEUTROS ABS 7.28*   IMMATURE GRANS (ABS) 0.03   LYMPHS ABS 1.41   MONOS ABS 0.54   EOS ABS 0.25   MCV 80.4         Lab 02/03/23  1035   SODIUM 138   POTASSIUM 4.1   CHLORIDE 103   CO2 25.9   ANION GAP 9.1   BUN 10   CREATININE 0.78   EGFR 97.4   GLUCOSE 97   CALCIUM 9.0   TSH 2.040         Lab 02/03/23  1035   TOTAL PROTEIN 7.3   ALBUMIN 4.4   GLOBULIN 2.9   ALT (SGPT) 9   AST (SGOT) 11   BILIRUBIN 0.3   ALK PHOS 77                                     Lab 02/03/23  1035   ETHANOL PCT <0.010   ETHANOL MGDL <10         Lab 02/03/23  1115   AMPH/METHAM SCREEN, URINE Negative   BENZODIAZEPINE SCREEN, URINE Negative   COCAINE SCREEN, URINE Negative   OPIATES Negative   THC URINE SCREEN Positive*   METHADONE SCREEN, URINE Negative     Brief Urine Lab Results  (Last result in the past 365 days)      Color   Clarity   Blood   Leuk Est   Nitrite   Protein   " CREAT   Urine HCG        02/03/23 1115               Negative                                 Imaging Results (Last 7 Days)     ** No results found for the last 168 hours. **           Labs Pending at Discharge:           Discharge Details        Discharge Medications      New Medications      Instructions Start Date   venlafaxine XR 75 MG 24 hr capsule  Commonly known as: EFFEXOR-XR   75 mg, Oral, Daily With Breakfast   Start Date: February 7, 2023            No Known Allergies      Discharge Disposition:  Home or Self Care    Diet:  Hospital:  Diet Order   Procedures   • Diet: Regular/House Diet; Texture: Regular Texture (IDDSI 7); Fluid Consistency: Thin (IDDSI 0)         Discharge Activity:   Activity Instructions     Activity as Tolerated            Discharge Condition: Stable    CODE STATUS:  Code Status and Medical Interventions:   Ordered at: 02/03/23 1700     Code Status (Patient has no pulse and is not breathing):    CPR (Attempt to Resuscitate)     Medical Interventions (Patient has pulse or is breathing):    Full Support         Future Appointments   Date Time Provider Department Center   2/28/2023  7:00 AM Brynn Arreola APRN [facility]       Additional Instructions for the Follow-ups that You Need to Schedule     Discharge Follow-up with PCP   As directed       Currently Documented PCP:    Provider, No Known    PCP Phone Number:    None     Follow Up Details: As needed         Discharge Follow-up with Specified Provider: [facility]   As directed      To: [facility]               Time spent on Discharge including face to face service: 30 minutes    Part of this note may be an electronic transcription/translation of spoken language to printed text using the Dragon dictation system.        Electronically signed by Hermilo Quezada MD, 02/06/23, 10:47 AM EST.

## 2023-02-06 NOTE — SIGNIFICANT NOTE
02/06/23 1107   Plan   Plan Patient will discharge home with scheduled outpatient at [facility].   Patient/Family in Agreement with Plan yes   Final Discharge Disposition Code 01 - home or self-care

## 2023-02-28 ENCOUNTER — OFFICE VISIT (OUTPATIENT)
Dept: FAMILY MEDICINE CLINIC | Facility: CLINIC | Age: 43
End: 2023-02-28
Payer: MEDICARE

## 2023-02-28 VITALS
SYSTOLIC BLOOD PRESSURE: 104 MMHG | RESPIRATION RATE: 20 BRPM | HEIGHT: 64 IN | DIASTOLIC BLOOD PRESSURE: 82 MMHG | BODY MASS INDEX: 27.23 KG/M2 | OXYGEN SATURATION: 100 % | HEART RATE: 70 BPM | WEIGHT: 159.5 LBS

## 2023-02-28 DIAGNOSIS — J30.2 SEASONAL ALLERGIC RHINITIS, UNSPECIFIED TRIGGER: ICD-10-CM

## 2023-02-28 DIAGNOSIS — Z12.31 ENCOUNTER FOR SCREENING MAMMOGRAM FOR MALIGNANT NEOPLASM OF BREAST: ICD-10-CM

## 2023-02-28 DIAGNOSIS — F41.1 GENERALIZED ANXIETY DISORDER: ICD-10-CM

## 2023-02-28 DIAGNOSIS — R41.83 BORDERLINE INTELLECTUAL FUNCTIONING: ICD-10-CM

## 2023-02-28 DIAGNOSIS — Z76.89 ESTABLISHING CARE WITH NEW DOCTOR, ENCOUNTER FOR: Primary | ICD-10-CM

## 2023-02-28 DIAGNOSIS — F33.41 RECURRENT MAJOR DEPRESSIVE DISORDER, IN PARTIAL REMISSION: ICD-10-CM

## 2023-02-28 DIAGNOSIS — Z00.00 ANNUAL PHYSICAL EXAM: ICD-10-CM

## 2023-02-28 PROCEDURE — 3008F BODY MASS INDEX DOCD: CPT

## 2023-02-28 PROCEDURE — 2014F MENTAL STATUS ASSESS: CPT

## 2023-02-28 PROCEDURE — 99203 OFFICE O/P NEW LOW 30 MIN: CPT

## 2023-02-28 PROCEDURE — 99386 PREV VISIT NEW AGE 40-64: CPT

## 2023-02-28 NOTE — ASSESSMENT & PLAN NOTE
Better controlled with Effexor, will continue on this medication.  Do encourage patient to get in with counselor/therapist as it would be beneficial as well.  Discussed with patient that if medication seems to not be working as well, wearing off, please let me know and further adjustments can be made if needed.

## 2023-02-28 NOTE — PROGRESS NOTES
Angela Song presents to [facility] with no acute complaints, patient is here to establish care.      History of Present Illness  This is a 43-year-old female, past medical history significant for anxiety, depression, allergic rhinitis, frequent ear infections, history of high blood pressure, history of hypothyroidism, who presents to clinic with no acute complaints but is here to establish care.    Anxiety/depression: Patient was recently admitted to the hospital due to pretty severe anxiety, states that she felt really overwhelmed, she was having panic attacks as well, but denied any suicidal thoughts or ideation.  States that she was not on medication at that time, has tried sertraline in the past, but came off of it because she was doing pretty well in regards to her anxiety.  Was then started on a new medication while in the hospital, Effexor, and states that she is been doing pretty well ever since.  Has been on it for about a month now.  Has noticed a difference, states that it definitely is helping a lot with her anxiety and her depression.  Is currently doing well on this, has not had any major side effects, and is scheduled to see a therapist/counselor tomorrow as well.  Currently denies any suicidal thoughts or ideation at this time.  Doing well.    Allergic rhinitis: Has had frequent ear infections in the past, but typically does pretty well.  No acute issues, does not take any medication on a routine basis, is been a while since she had an ear infection.    Does have a history of high blood pressure and hypothyroidism, was at one time on medication for both of these, but has not been on any medication since.  Recent TSH was checked in the hospital, which was within normal range.  Blood pressure today is 104/82.  Denies chest pain or shortness of breath.    Has never had mammogram, will order today.  We will do Pap smear at next visit.  Is up-to-date on blood work.   "Otherwise is up-to-date on other preventative screenings as well.    Past Medical History:   Diagnosis Date   • Anxiety    • Borderline intellectual functioning    • Hypertension    • Hypothyroid      No past surgical history on file.    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   • Highest education level: High school graduate   Tobacco Use   • Smoking status: Never   • Smokeless tobacco: Current     Types: Chew   Vaping Use   • Vaping Use: Never used   Substance and Sexual Activity   • Alcohol use: Yes     Comment: beer on occassion   • Drug use: Yes     Types: Marijuana     Comment: took marijuana gummy last night from a friend to help w anxiety   • Sexual activity: Defer     Socioeconomic History   • Marital status: Single   • Number of children: 0   • Years of education: 12 --was in special education   • Highest education level: High school graduate   Tobacco Use   • Smoking status: Never   • Smokeless tobacco: Current     Types: Chew   Vaping Use   • Vaping Use: Never used   Substance and Sexual Activity   • Alcohol use: Yes     Comment: beer on occassion   • Drug use: Yes     Types: Marijuana     Comment: took marijuana gummy last night from a friend to help w anxiety   • Sexual activity: Defer      Problem Relation Age of Onset   • Hypertension Mother    • Clotting disorder Father    • Hypertension Sister    • Alcohol abuse Sister    • Diabetes Maternal Grandfather    • Suicidality Maternal Uncle          Objective   Vital Signs:   /82   Pulse 70   Resp 20   Ht 162.6 cm (64\")   Wt 72.3 kg (159 lb 8 oz)   SpO2 100%   BMI 27.38 kg/m²     Body mass index is 27.38 kg/m².    All labs, imaging, test results, and specialty provider notes reviewed with patient.       Physical Exam  Vitals reviewed.   Constitutional:       Appearance: Normal appearance.   Cardiovascular:      Rate and Rhythm: Normal " rate and regular rhythm.      Pulses: Normal pulses.      Heart sounds: Normal heart sounds.   Pulmonary:      Effort: Pulmonary effort is normal.      Breath sounds: Normal breath sounds.   Neurological:      General: No focal deficit present.      Mental Status: She is alert and oriented to person, place, and time.              Assessment and Plan:  Diagnoses and all orders for this visit:    1. Establishing care with new doctor, encounter for (Primary)    2. Generalized anxiety disorder  Assessment & Plan:  Better controlled with Effexor, will continue on this medication.  Do encourage patient to get in with counselor/therapist as it would be beneficial as well.  Discussed with patient that if medication seems to not be working as well, wearing off, please let me know and further adjustments can be made if needed.      3. Borderline intellectual functioning    4. Recurrent major depressive disorder, in partial remission (HCC)  Assessment & Plan:  Improved since recent admission, continue on Effexor at current dose.  Continue with counseling/therapy as it is beneficial there as well.  Strict ER precautions for any suicidal thoughts or ideation.      5. Seasonal allergic rhinitis, unspecified trigger    6. Encounter for screening mammogram for malignant neoplasm of breast  -     Mammo Screening Digital Tomosynthesis Bilateral With CAD; Future    7. Annual physical exam    Anticipatory Guidelines discussed with patient.    Discussed getting adequate exercise, reduced TV/electronic time, car safety including seat belt use, sexual activity (if applicable), and smoking/alcohol use (if applicable).    Follow Up:  Return in about 6 months (around 8/28/2023) for Pap Smear.    Patient was given instructions and counseling regarding her condition or for health maintenance advice. Please see specific information pulled into the AVS if appropriate.

## 2023-02-28 NOTE — ASSESSMENT & PLAN NOTE
Improved since recent admission, continue on Effexor at current dose.  Continue with counseling/therapy as it is beneficial there as well.  Strict ER precautions for any suicidal thoughts or ideation.

## 2023-05-04 ENCOUNTER — PATIENT MESSAGE (OUTPATIENT)
Dept: FAMILY MEDICINE CLINIC | Facility: CLINIC | Age: 43
End: 2023-05-04
Payer: MEDICARE

## 2023-05-04 RX ORDER — VENLAFAXINE HYDROCHLORIDE 75 MG/1
75 CAPSULE, EXTENDED RELEASE ORAL
Qty: 30 CAPSULE | Refills: 2 | Status: SHIPPED | OUTPATIENT
Start: 2023-05-04

## 2023-05-04 NOTE — TELEPHONE ENCOUNTER
From: Angela Song  To: Brynn Arreola  Sent: 5/4/2023 8:09 AM EDT  Subject: Refill on med    This message is being sent by Simran Song on behalf of Angela Song.    [facility] venlafaxine er 75mg. Is out. Could you send a refill to [facility] [place] please

## 2023-05-18 ENCOUNTER — HOSPITAL ENCOUNTER (OUTPATIENT)
Dept: MAMMOGRAPHY | Facility: HOSPITAL | Age: 43
Discharge: HOME | End: 2023-05-18
Payer: COMMERCIAL

## 2023-05-18 DIAGNOSIS — Z12.31 ENCOUNTER FOR SCREENING MAMMOGRAM FOR MALIGNANT NEOPLASM OF BREAST: ICD-10-CM

## 2023-05-18 PROCEDURE — 77067 SCR MAMMO BI INCL CAD: CPT

## 2023-05-18 PROCEDURE — 77063 BREAST TOMOSYNTHESIS BI: CPT

## 2023-07-27 RX ORDER — VENLAFAXINE HYDROCHLORIDE 75 MG/1
75 CAPSULE, EXTENDED RELEASE ORAL
Qty: 30 CAPSULE | Refills: 2 | Status: SHIPPED | OUTPATIENT
Start: 2023-07-27

## 2023-08-28 ENCOUNTER — OFFICE VISIT (OUTPATIENT)
Dept: FAMILY MEDICINE CLINIC | Facility: CLINIC | Age: 43
End: 2023-08-28
Payer: MEDICARE

## 2023-08-28 VITALS
BODY MASS INDEX: 29.72 KG/M2 | DIASTOLIC BLOOD PRESSURE: 78 MMHG | WEIGHT: 174.1 LBS | HEART RATE: 75 BPM | SYSTOLIC BLOOD PRESSURE: 128 MMHG | TEMPERATURE: 97.6 F | OXYGEN SATURATION: 99 % | HEIGHT: 64 IN

## 2023-08-28 DIAGNOSIS — Z01.419 WOMEN'S ANNUAL ROUTINE GYNECOLOGICAL EXAMINATION: Primary | ICD-10-CM

## 2023-08-28 DIAGNOSIS — F41.1 GENERALIZED ANXIETY DISORDER: ICD-10-CM

## 2023-08-28 DIAGNOSIS — F33.41 RECURRENT MAJOR DEPRESSIVE DISORDER, IN PARTIAL REMISSION: ICD-10-CM

## 2023-08-28 LAB
CANDIDA SPECIES: NEGATIVE
GARDNERELLA VAGINALIS: NEGATIVE
T VAGINALIS DNA VAG QL PROBE+SIG AMP: NEGATIVE

## 2023-08-28 PROCEDURE — 87510 GARDNER VAG DNA DIR PROBE: CPT

## 2023-08-28 PROCEDURE — 87480 CANDIDA DNA DIR PROBE: CPT

## 2023-08-28 PROCEDURE — G0123 SCREEN CERV/VAG THIN LAYER: HCPCS

## 2023-08-28 PROCEDURE — 87624 HPV HI-RISK TYP POOLED RSLT: CPT

## 2023-08-28 PROCEDURE — 87660 TRICHOMONAS VAGIN DIR PROBE: CPT

## 2023-08-28 RX ORDER — BUSPIRONE HYDROCHLORIDE 5 MG/1
5 TABLET ORAL 3 TIMES DAILY
Qty: 90 TABLET | Refills: 3 | Status: SHIPPED | OUTPATIENT
Start: 2023-08-28

## 2023-08-28 RX ORDER — VENLAFAXINE HYDROCHLORIDE 75 MG/1
75 CAPSULE, EXTENDED RELEASE ORAL
Qty: 90 CAPSULE | Refills: 3 | Status: SHIPPED | OUTPATIENT
Start: 2023-08-28

## 2023-08-28 NOTE — PROGRESS NOTES
The ABCs of the Annual Wellness Visit  Initial Medicare Wellness Visit    Subjective     Angela Song is a 43 y.o. female who presents for an Initial Medicare Wellness Visit.    The following portions of the patient's history were reviewed and   updated as appropriate: allergies, current medications, past family history, past medical history, past social history, past surgical history, and problem list.     Compared to one year ago, the patient feels her physical   health is the same.    Compared to one year ago, the patient feels her mental   health is the same.    Recent Hospitalizations:  This patient has had a [facility] admission record on file within the last 365 days.    Current Medical Providers:  Patient Care Team:  Brynn Arreola APRN as PCP - General (Nurse Practitioner)    Outpatient Medications Prior to Visit   Medication Sig Dispense Refill    venlafaxine XR (EFFEXOR-XR) 75 MG 24 hr capsule Take 1 capsule by mouth Daily With Breakfast. Indications: Generalized Anxiety Disorder, Major Depressive Disorder 30 capsule 2     No facility-administered medications prior to visit.       No opioid medication identified on active medication list. I have reviewed chart for other potential  high risk medication/s and harmful drug interactions in the elderly.        Aspirin is not on active medication list.  Aspirin use is not indicated based on review of current medical condition/s. Risk of harm outweighs potential benefits.  .    Patient Active Problem List   Diagnosis    Major depressive disorder, recurrent episode, moderate    Marijuana abuse    Borderline intellectual functioning    Generalized anxiety disorder    Recurrent major depressive disorder, in partial remission     Advance Care Planning   Advance Care Planning     Advance Directive is not on file.  ACP discussion was declined by the patient. Patient does not have an advance directive, declines further assistance.       Objective   "  Vitals:    23 1053   BP: 128/78   BP Location: Left arm   Patient Position: Sitting   Cuff Size: Adult   Pulse: 75   Temp: 97.6 øF (36.4 øC)   TempSrc: Temporal   SpO2: 99%   Weight: 79 kg (174 lb 1.6 oz)   Height: 162.6 cm (64\")     Estimated body mass index is 29.88 kg/mý as calculated from the following:    Height as of this encounter: 162.6 cm (64\").    Weight as of this encounter: 79 kg (174 lb 1.6 oz).    BMI is >= 25 and <30. (Overweight) The following options were offered after discussion;: exercise counseling/recommendations and nutrition counseling/recommendations      Does the patient have evidence of cognitive impairment?   No          HEALTH RISK ASSESSMENT    Smoking Status:  Social History     Tobacco Use   Smoking Status Never   Smokeless Tobacco Current    Types: Chew     Alcohol Consumption:  Social History     Substance and Sexual Activity   Alcohol Use Yes    Comment: beer on occassion     Fall Risk Screen:    AVNI Fall Risk Assessment was completed, and patient is at LOW risk for falls.Assessment completed on:2023    Depression Screen:       2023    10:00 AM   PHQ-2/PHQ-9 Depression Screening   Little Interest or Pleasure in Doing Things 1-->several days   Feeling Down, Depressed or Hopeless 0-->not at all   PHQ-9: Brief Depression Severity Measure Score 1       Health Habits and Functional and Cognitive Screenin/28/2023    10:00 AM   Functional & Cognitive Status   Do you have difficulty preparing food and eating? No   Do you have difficulty bathing yourself, getting dressed or grooming yourself? No   Do you have difficulty using the toilet? No   Do you have difficulty moving around from place to place? No   Do you have trouble with steps or getting out of a bed or a chair? No   Current Diet Well Balanced Diet   Dental Exam Up to date   Eye Exam Up to date   Exercise (times per week) 3 times per week   Current Exercises Include Walking   Do you need help using the " phone?  No   Are you deaf or do you have serious difficulty hearing?  No   Do you need help to go to places out of walking distance? Yes   Do you need help shopping? Yes   Do you need help preparing meals?  Yes   Do you need help with housework?  No   Do you need help with laundry? No   Do you need help taking your medications? Yes   Do you need help managing money? Yes   Do you ever drive or ride in a car without wearing a seat belt? No   Have you felt unusual stress, anger or loneliness in the last month? Yes   Who do you live with? Other   If you need help, do you have trouble finding someone available to you? No   Have you been bothered in the last four weeks by sexual problems? No   Do you have difficulty concentrating, remembering or making decisions? No       Age-appropriate Screening Schedule:  Refer to the list below for future screening recommendations based on patient's age, sex and/or medical conditions. Orders for these recommended tests are listed in the plan section. The patient has been provided with a written plan.    Health Maintenance   Topic Date Due    TDAP/TD VACCINES (2 - Tdap) 08/19/2012    HEPATITIS C SCREENING  Never done    PAP SMEAR  Never done    COVID-19 Vaccine (3 - Moderna series) 10/02/2023 (Originally 7/1/2021)    INFLUENZA VACCINE  10/01/2023    ANNUAL WELLNESS VISIT  08/28/2024    Pneumococcal Vaccine 0-64  Aged Out          CMS Preventative Services Quick Reference  Risk Factors Identified During Encounter    Depression/Dysphoria: Current medication is effective, no change recommended    The above risks/problems have been discussed with the patient.  Pertinent information has been shared with the patient in the After Visit Summary.  An After Visit Summary and PPPS were made available to the patient.  Diagnoses and all orders for this visit:    1. Women's annual routine gynecological examination (Primary)  -     IgP, Aptima HPV; Future  -     Gardnerella vaginalis, Trichomonas  vaginalis, Candida albicans, DNA - Swab, Vagina  -     IgP, Aptima HPV    2. Recurrent major depressive disorder, in partial remission  -     venlafaxine XR (EFFEXOR-XR) 75 MG 24 hr capsule; Take 1 capsule by mouth Daily With Breakfast. Indications: Generalized Anxiety Disorder, Major Depressive Disorder  Dispense: 90 capsule; Refill: 3  -     busPIRone (BUSPAR) 5 MG tablet; Take 1 tablet by mouth 3 (Three) Times a Day.  Dispense: 90 tablet; Refill: 3    3. Generalized anxiety disorder  Comments:  Continue Effexor, add BuSpar as needed.  Orders:  -     venlafaxine XR (EFFEXOR-XR) 75 MG 24 hr capsule; Take 1 capsule by mouth Daily With Breakfast. Indications: Generalized Anxiety Disorder, Major Depressive Disorder  Dispense: 90 capsule; Refill: 3  -     busPIRone (BUSPAR) 5 MG tablet; Take 1 tablet by mouth 3 (Three) Times a Day.  Dispense: 90 tablet; Refill: 3      Follow Up:  Next Medicare Wellness visit to be scheduled in 1 year.        Additional E&M Note during same encounter follows:  Patient has multiple medical problems which are significant and separately identifiable that require additional work above and beyond the Medicare Wellness Visit.      Chief Complaint  No chief complaint on file.    Subjective        HPI  Angela Song is also being seen today for annual physical and Pap smear.    No acute complaints.  Is never had Pap smear, denies any vaginal discharge, irritation or pain.  Menstrual cycle regular.  Up-to-date on mammogram.  Patient to get Tdap via pharmacy.    Patient states that she does at times have a breakthrough anxiety issues, states that the Effexor does give her some relief, but she was wondering if there is a medication she could take for only as needed.  Has had a lot of stress recently, her daughter recently had puppies, and a few of them have  and that is caused some increased stress.         Objective   Vital Signs:  /78 (BP Location: Left arm, Patient Position:  "Sitting, Cuff Size: Adult)   Pulse 75   Temp 97.6 øF (36.4 øC) (Temporal)   Ht 162.6 cm (64\")   Wt 79 kg (174 lb 1.6 oz)   SpO2 99%   BMI 29.88 kg/mý     Physical Exam  Vitals reviewed.   Constitutional:       Appearance: Normal appearance.   Cardiovascular:      Rate and Rhythm: Normal rate and regular rhythm.      Pulses: Normal pulses.      Heart sounds: Normal heart sounds.   Pulmonary:      Effort: Pulmonary effort is normal.      Breath sounds: Normal breath sounds.   Genitourinary:     Comments: Pap smear performed  Neurological:      General: No focal deficit present.      Mental Status: She is alert and oriented to person, place, and time.                       Assessment and Plan   Diagnoses and all orders for this visit:    1. Women's annual routine gynecological examination (Primary)  -     IgP, Aptima HPV; Future  -     Gardnerella vaginalis, Trichomonas vaginalis, Candida albicans, DNA - Swab, Vagina  -     IgP, Aptima HPV    2. Recurrent major depressive disorder, in partial remission  -     venlafaxine XR (EFFEXOR-XR) 75 MG 24 hr capsule; Take 1 capsule by mouth Daily With Breakfast. Indications: Generalized Anxiety Disorder, Major Depressive Disorder  Dispense: 90 capsule; Refill: 3  -     busPIRone (BUSPAR) 5 MG tablet; Take 1 tablet by mouth 3 (Three) Times a Day.  Dispense: 90 tablet; Refill: 3    3. Generalized anxiety disorder  Comments:  Continue Effexor, add BuSpar as needed.  Orders:  -     venlafaxine XR (EFFEXOR-XR) 75 MG 24 hr capsule; Take 1 capsule by mouth Daily With Breakfast. Indications: Generalized Anxiety Disorder, Major Depressive Disorder  Dispense: 90 capsule; Refill: 3  -     busPIRone (BUSPAR) 5 MG tablet; Take 1 tablet by mouth 3 (Three) Times a Day.  Dispense: 90 tablet; Refill: 3             Follow Up   Return in about 1 year (around 8/28/2024) for Annual physical.  Patient was given instructions and counseling regarding her condition or for health maintenance " advice. Please see specific information pulled into the AVS if appropriate.

## 2023-09-01 LAB
CYTOLOGIST CVX/VAG CYTO: NORMAL
CYTOLOGY CVX/VAG DOC CYTO: NORMAL
CYTOLOGY CVX/VAG DOC THIN PREP: NORMAL
DX ICD CODE: NORMAL
HIV 1 & 2 AB SER-IMP: NORMAL
HPV I/H RISK 4 DNA CVX QL PROBE+SIG AMP: NEGATIVE
OTHER STN SPEC: NORMAL
STAT OF ADQ CVX/VAG CYTO-IMP: NORMAL

## 2024-01-08 DIAGNOSIS — F41.1 GENERALIZED ANXIETY DISORDER: ICD-10-CM

## 2024-01-08 DIAGNOSIS — F33.41 RECURRENT MAJOR DEPRESSIVE DISORDER, IN PARTIAL REMISSION: ICD-10-CM

## 2024-01-08 RX ORDER — BUSPIRONE HYDROCHLORIDE 5 MG/1
5 TABLET ORAL 3 TIMES DAILY
Qty: 90 TABLET | Refills: 12 | Status: SHIPPED | OUTPATIENT
Start: 2024-01-08

## 2024-06-13 RX ORDER — HYDROXYZINE HYDROCHLORIDE 25 MG/1
25 TABLET, FILM COATED ORAL NIGHTLY PRN
Qty: 90 TABLET | Refills: 1 | Status: SHIPPED | OUTPATIENT
Start: 2024-06-13

## 2024-06-20 ENCOUNTER — TRANSCRIBE ORDERS (OUTPATIENT)
Dept: FAMILY MEDICINE CLINIC | Facility: CLINIC | Age: 44
End: 2024-06-20
Payer: MEDICARE

## 2024-06-20 DIAGNOSIS — Z12.31 SCREENING MAMMOGRAM, ENCOUNTER FOR: Primary | ICD-10-CM

## 2024-08-27 ENCOUNTER — HOSPITAL ENCOUNTER (OUTPATIENT)
Dept: MAMMOGRAPHY | Facility: HOSPITAL | Age: 44
Discharge: HOME | End: 2024-08-27
Payer: MEDICARE

## 2024-08-27 DIAGNOSIS — Z12.31 SCREENING MAMMOGRAM, ENCOUNTER FOR: ICD-10-CM

## 2024-08-27 PROCEDURE — 77067 SCR MAMMO BI INCL CAD: CPT

## 2024-08-27 PROCEDURE — 77063 BREAST TOMOSYNTHESIS BI: CPT

## 2024-08-30 DIAGNOSIS — Z12.31 ENCOUNTER FOR SCREENING MAMMOGRAM FOR MALIGNANT NEOPLASM OF BREAST: Primary | ICD-10-CM

## 2024-09-03 ENCOUNTER — OFFICE VISIT (OUTPATIENT)
Dept: FAMILY MEDICINE CLINIC | Facility: CLINIC | Age: 44
End: 2024-09-03
Payer: MEDICARE

## 2024-09-03 VITALS
OXYGEN SATURATION: 100 % | BODY MASS INDEX: 32.27 KG/M2 | SYSTOLIC BLOOD PRESSURE: 126 MMHG | TEMPERATURE: 98.2 F | HEART RATE: 84 BPM | HEIGHT: 64 IN | WEIGHT: 189 LBS | DIASTOLIC BLOOD PRESSURE: 80 MMHG

## 2024-09-03 DIAGNOSIS — Z00.00 MEDICARE ANNUAL WELLNESS VISIT, SUBSEQUENT: Primary | ICD-10-CM

## 2024-09-03 PROCEDURE — G0439 PPPS, SUBSEQ VISIT: HCPCS

## 2024-09-03 PROCEDURE — 1170F FXNL STATUS ASSESSED: CPT

## 2024-09-03 NOTE — PROGRESS NOTES
Subjective   The ABCs of the Annual Wellness Visit  Medicare Wellness Visit      Angela Song is a 44 y.o. patient who presents for a Medicare Wellness Visit.    The following portions of the patient's history were reviewed and   updated as appropriate: allergies, current medications, past family history, past medical history, past social history, past surgical history, and problem list.    Compared to one year ago, the patient's physical   health is the same.  Compared to one year ago, the patient's mental   health is the same.    Recent Hospitalizations:  She was not admitted to the hospital during the last year.     Current Medical Providers:  Patient Care Team:  Brynn Arreola APRN as PCP - General (Nurse Practitioner)    Outpatient Medications Prior to Visit   Medication Sig Dispense Refill    busPIRone (BUSPAR) 5 MG tablet TAKE 1 TABLET BY MOUTH THREE TIMES DAILY 90 tablet 12    hydrOXYzine (ATARAX) 25 MG tablet Take 1 tablet by mouth At Night As Needed (sleep). 90 tablet 1    venlafaxine XR (EFFEXOR-XR) 75 MG 24 hr capsule Take 1 capsule by mouth Daily With Breakfast. Indications: Generalized Anxiety Disorder, Major Depressive Disorder 90 capsule 3     No facility-administered medications prior to visit.     No opioid medication identified on active medication list. I have reviewed chart for other potential  high risk medication/s and harmful drug interactions in the elderly.      Aspirin is not on active medication list.  Aspirin use is not indicated based on review of current medical condition/s. Risk of harm outweighs potential benefits.  .    Patient Active Problem List   Diagnosis    Major depressive disorder, recurrent episode, moderate    Marijuana abuse    Borderline intellectual functioning    Generalized anxiety disorder    Recurrent major depressive disorder, in partial remission     Advance Care Planning Advance Directive is not on file.  ACP discussion was declined by the patient.  "Patient does not have an advance directive, declines further assistance.            Objective   Vitals:    24 1052   BP: 126/80   BP Location: Left arm   Patient Position: Sitting   Cuff Size: Adult   Pulse: 84   Temp: 98.2 °F (36.8 °C)   SpO2: 100%   Weight: 85.7 kg (189 lb)   Height: 162.6 cm (64.02\")       Estimated body mass index is 32.43 kg/m² as calculated from the following:    Height as of this encounter: 162.6 cm (64.02\").    Weight as of this encounter: 85.7 kg (189 lb).    BMI is >= 30 and <35. (Class 1 Obesity). The following options were offered after discussion;: exercise counseling/recommendations and nutrition counseling/recommendations       Does the patient have evidence of cognitive impairment? No                                                                                               Health  Risk Assessment    Smoking Status:  Social History     Tobacco Use   Smoking Status Never   Smokeless Tobacco Current    Types: Chew     Alcohol Consumption:  Social History     Substance and Sexual Activity   Alcohol Use Yes    Comment: beer on occassion       Fall Risk Screen  [facility] Fall Risk Assessment was completed, and patient is at LOW risk for falls.Assessment completed on:9/3/2024    Depression Screenin/3/2024    10:55 AM   PHQ-2/PHQ-9 Depression Screening   Little Interest or Pleasure in Doing Things 0-->not at all   Feeling Down, Depressed or Hopeless 0-->not at all   PHQ-9: Brief Depression Severity Measure Score 0     Health Habits and Functional and Cognitive Screenin/3/2024    10:53 AM   Functional & Cognitive Status   Do you have difficulty preparing food and eating? No   Do you have difficulty bathing yourself, getting dressed or grooming yourself? No   Do you have difficulty using the toilet? No   Do you have difficulty moving around from place to place? No   Do you have trouble with steps or getting out of a bed or a chair? No   Current Diet Frequent Junk Food "   Dental Exam Up to date   Eye Exam Up to date   Exercise (times per week) 0 times per week   Current Exercises Include No Regular Exercise   Do you need help using the phone?  Yes   Are you deaf or do you have serious difficulty hearing?  No   Do you need help to go to places out of walking distance? No   Do you need help shopping? Yes   Do you need help preparing meals?  No   Do you need help with housework?  No   Do you need help with laundry? No   Do you need help taking your medications? No   Do you need help managing money? Yes   Do you ever drive or ride in a car without wearing a seat belt? No   Have you felt unusual stress, anger or loneliness in the last month? No   Who do you live with? Other   If you need help, do you have trouble finding someone available to you? No   Have you been bothered in the last four weeks by sexual problems? No   Do you have difficulty concentrating, remembering or making decisions? No           Age-appropriate Screening Schedule:  Refer to the list below for future screening recommendations based on patient's age, sex and/or medical conditions. Orders for these recommended tests are listed in the plan section. The patient has been provided with a written plan.    Health Maintenance List  Health Maintenance   Topic Date Due    TDAP/TD VACCINES (2 - Tdap) 08/19/2012    HEPATITIS C SCREENING  Never done    BMI FOLLOWUP  08/28/2024    COVID-19 Vaccine (3 - 2023-24 season) 09/01/2024    INFLUENZA VACCINE  08/01/2024    ANNUAL WELLNESS VISIT  09/03/2025    MAMMOGRAM  08/27/2026    PAP SMEAR  08/28/2026    Pneumococcal Vaccine 0-64  Aged Out                                                                                                                                                CMS Preventative Services Quick Reference  Risk Factors Identified During Encounter  Inactivity/Sedentary: Patient was advised to exercise at least 150 minutes a week per CDC recommendations.    The above  risks/problems have been discussed with the patient.  Pertinent information has been shared with the patient in the After Visit Summary.  An After Visit Summary and PPPS were made available to the patient.    Follow Up:   Next Medicare Wellness visit to be scheduled in 1 year.     Assessment & Plan  Medicare annual wellness visit, subsequent               Follow Up:   Return in about 1 year (around 9/3/2025) for Medicare Wellness.

## 2025-01-16 ENCOUNTER — CLINICAL SUPPORT (OUTPATIENT)
Dept: FAMILY MEDICINE CLINIC | Facility: CLINIC | Age: 45
End: 2025-01-16
Payer: MEDICARE

## 2025-01-16 DIAGNOSIS — Z23 NEED FOR IMMUNIZATION AGAINST INFLUENZA: Primary | ICD-10-CM

## 2025-01-16 PROCEDURE — G0008 ADMIN INFLUENZA VIRUS VAC: HCPCS

## 2025-01-16 PROCEDURE — 90656 IIV3 VACC NO PRSV 0.5 ML IM: CPT

## 2025-08-28 ENCOUNTER — HOSPITAL ENCOUNTER (OUTPATIENT)
Dept: MAMMOGRAPHY | Facility: HOSPITAL | Age: 45
Discharge: HOME | End: 2025-08-28
Payer: MEDICARE

## 2025-08-28 DIAGNOSIS — Z12.31 ENCOUNTER FOR SCREENING MAMMOGRAM FOR MALIGNANT NEOPLASM OF BREAST: ICD-10-CM

## 2025-08-28 PROCEDURE — 77067 SCR MAMMO BI INCL CAD: CPT

## 2025-08-28 PROCEDURE — 77063 BREAST TOMOSYNTHESIS BI: CPT
